# Patient Record
Sex: FEMALE | URBAN - METROPOLITAN AREA
[De-identification: names, ages, dates, MRNs, and addresses within clinical notes are randomized per-mention and may not be internally consistent; named-entity substitution may affect disease eponyms.]

---

## 2021-05-29 ENCOUNTER — APPOINTMENT (RX ONLY)
Dept: URBAN - METROPOLITAN AREA CLINIC 55 | Facility: CLINIC | Age: 31
Setting detail: DERMATOLOGY
End: 2021-05-29

## 2021-05-29 DIAGNOSIS — Z41.9 ENCOUNTER FOR PROCEDURE FOR PURPOSES OTHER THAN REMEDYING HEALTH STATE, UNSPECIFIED: ICD-10-CM

## 2021-05-29 PROCEDURE — ? COSMETIC CONSULTATION: GENERAL

## 2021-05-29 PROCEDURE — ? BOTOX

## 2021-05-29 NOTE — PROCEDURE: BOTOX
Show Additional Area 1: Yes
Show Ucl Units: No
Nasal Root Units: 0
Additional Area 1 Location: Lateral Brows
Expiration Date (Month Year): 9/2023
Detail Level: Detailed
Additional Area 3 Location: Chin
Lot #: [id number]
Post-Care Instructions: Patient instructed to not lie down for 4 hours and limit physical activity for 24 hours.
Additional Area 2 Location: upper and lower lip
Dilution (U/0.1 Cc): 4
Consent: Written consent obtained. Risks include but not limited to lid/brow ptosis, bruising, swelling, diplopia, temporary effect, incomplete chemical denervation.

## 2021-09-17 ENCOUNTER — APPOINTMENT (RX ONLY)
Dept: URBAN - METROPOLITAN AREA CLINIC 55 | Facility: CLINIC | Age: 31
Setting detail: DERMATOLOGY
End: 2021-09-17

## 2021-09-17 DIAGNOSIS — Z41.9 ENCOUNTER FOR PROCEDURE FOR PURPOSES OTHER THAN REMEDYING HEALTH STATE, UNSPECIFIED: ICD-10-CM

## 2021-09-17 PROCEDURE — ? BOTOX

## 2021-09-17 NOTE — PROCEDURE: BOTOX
Additional Area 4 Units: 0
Consent: Written consent was obtained.
Show Levator Superior Units: Yes
Glabellar Complex Units: 16
Dilution (U/0.1 Cc): 4
Additional Area 1 Location: Upper lip
Show Lcl Units: No
Periorbital Skin Units: 12
Lot #: [id number]
Detail Level: Zone
Expiration Date (Month Year): 11/2023
Post-Care Instructions: After care instructions were provided to the patient.
Forehead Units: 6